# Patient Record
Sex: MALE | Race: OTHER | Employment: UNEMPLOYED | URBAN - METROPOLITAN AREA
[De-identification: names, ages, dates, MRNs, and addresses within clinical notes are randomized per-mention and may not be internally consistent; named-entity substitution may affect disease eponyms.]

---

## 2024-03-24 ENCOUNTER — APPOINTMENT (OUTPATIENT)
Dept: CT IMAGING | Age: 24
DRG: 399 | End: 2024-03-24
Payer: MEDICAID

## 2024-03-24 ENCOUNTER — HOSPITAL ENCOUNTER (INPATIENT)
Age: 24
LOS: 1 days | Discharge: HOME | DRG: 399 | End: 2024-03-25
Attending: SURGERY | Admitting: SURGERY
Payer: MEDICAID

## 2024-03-24 DIAGNOSIS — K37 APPENDICITIS, UNSPECIFIED APPENDICITIS TYPE: ICD-10-CM

## 2024-03-24 DIAGNOSIS — K35.80 ACUTE APPENDICITIS, UNSPECIFIED ACUTE APPENDICITIS TYPE: Primary | ICD-10-CM

## 2024-03-24 LAB
ALBUMIN SERPL-MCNC: 4.7 G/DL (ref 3.5–5.2)
ALP SERPL-CCNC: 60 U/L (ref 40–129)
ALT SERPL-CCNC: 128 U/L (ref 0–40)
ANION GAP SERPL CALCULATED.3IONS-SCNC: 12 MMOL/L (ref 7–16)
AST SERPL-CCNC: 53 U/L (ref 0–39)
BASOPHILS # BLD: 0.04 K/UL (ref 0–0.2)
BASOPHILS NFR BLD: 0 % (ref 0–2)
BILIRUB SERPL-MCNC: 0.9 MG/DL (ref 0–1.2)
BUN SERPL-MCNC: 9 MG/DL (ref 6–20)
CALCIUM SERPL-MCNC: 8.8 MG/DL (ref 8.6–10.2)
CHLORIDE SERPL-SCNC: 100 MMOL/L (ref 98–107)
CO2 SERPL-SCNC: 28 MMOL/L (ref 22–29)
CREAT SERPL-MCNC: 0.9 MG/DL (ref 0.7–1.2)
EOSINOPHIL # BLD: 0.17 K/UL (ref 0.05–0.5)
EOSINOPHILS RELATIVE PERCENT: 2 % (ref 0–6)
ERYTHROCYTE [DISTWIDTH] IN BLOOD BY AUTOMATED COUNT: 12.4 % (ref 11.5–15)
GFR SERPL CREATININE-BSD FRML MDRD: >60 ML/MIN/1.73M2
GLUCOSE SERPL-MCNC: 118 MG/DL (ref 74–99)
HCT VFR BLD AUTO: 46.9 % (ref 37–54)
HGB BLD-MCNC: 15.3 G/DL (ref 12.5–16.5)
IMM GRANULOCYTES # BLD AUTO: 0.04 K/UL (ref 0–0.58)
IMM GRANULOCYTES NFR BLD: 0 % (ref 0–5)
LACTATE BLDV-SCNC: 1.7 MMOL/L (ref 0.5–2.2)
LIPASE SERPL-CCNC: 22 U/L (ref 13–60)
LYMPHOCYTES NFR BLD: 2.7 K/UL (ref 1.5–4)
LYMPHOCYTES RELATIVE PERCENT: 26 % (ref 20–42)
MCH RBC QN AUTO: 27.3 PG (ref 26–35)
MCHC RBC AUTO-ENTMCNC: 32.6 G/DL (ref 32–34.5)
MCV RBC AUTO: 83.8 FL (ref 80–99.9)
MONOCYTES NFR BLD: 0.93 K/UL (ref 0.1–0.95)
MONOCYTES NFR BLD: 9 % (ref 2–12)
NEUTROPHILS NFR BLD: 63 % (ref 43–80)
NEUTS SEG NFR BLD: 6.45 K/UL (ref 1.8–7.3)
PLATELET # BLD AUTO: 283 K/UL (ref 130–450)
PMV BLD AUTO: 9.4 FL (ref 7–12)
POTASSIUM SERPL-SCNC: 4.1 MMOL/L (ref 3.5–5)
PROT SERPL-MCNC: 7.6 G/DL (ref 6.4–8.3)
RBC # BLD AUTO: 5.6 M/UL (ref 3.8–5.8)
SODIUM SERPL-SCNC: 140 MMOL/L (ref 132–146)
WBC OTHER # BLD: 10.3 K/UL (ref 4.5–11.5)

## 2024-03-24 PROCEDURE — 83690 ASSAY OF LIPASE: CPT

## 2024-03-24 PROCEDURE — 81001 URINALYSIS AUTO W/SCOPE: CPT

## 2024-03-24 PROCEDURE — 83605 ASSAY OF LACTIC ACID: CPT

## 2024-03-24 PROCEDURE — 85025 COMPLETE CBC W/AUTO DIFF WBC: CPT

## 2024-03-24 PROCEDURE — 80053 COMPREHEN METABOLIC PANEL: CPT

## 2024-03-24 PROCEDURE — 99285 EMERGENCY DEPT VISIT HI MDM: CPT

## 2024-03-24 PROCEDURE — 6360000004 HC RX CONTRAST MEDICATION: Performed by: RADIOLOGY

## 2024-03-24 PROCEDURE — 74177 CT ABD & PELVIS W/CONTRAST: CPT

## 2024-03-24 PROCEDURE — 96375 TX/PRO/DX INJ NEW DRUG ADDON: CPT

## 2024-03-24 PROCEDURE — 96374 THER/PROPH/DIAG INJ IV PUSH: CPT

## 2024-03-24 RX ORDER — ONDANSETRON 2 MG/ML
4 INJECTION INTRAMUSCULAR; INTRAVENOUS ONCE
Status: COMPLETED | OUTPATIENT
Start: 2024-03-24 | End: 2024-03-25

## 2024-03-24 RX ORDER — 0.9 % SODIUM CHLORIDE 0.9 %
1000 INTRAVENOUS SOLUTION INTRAVENOUS ONCE
Status: COMPLETED | OUTPATIENT
Start: 2024-03-24 | End: 2024-03-25

## 2024-03-24 RX ADMIN — IOPAMIDOL 75 ML: 755 INJECTION, SOLUTION INTRAVENOUS at 23:29

## 2024-03-25 ENCOUNTER — ANESTHESIA (OUTPATIENT)
Dept: OPERATING ROOM | Age: 24
DRG: 399 | End: 2024-03-25
Payer: MEDICAID

## 2024-03-25 ENCOUNTER — ANESTHESIA EVENT (OUTPATIENT)
Dept: OPERATING ROOM | Age: 24
DRG: 399 | End: 2024-03-25
Payer: MEDICAID

## 2024-03-25 VITALS
TEMPERATURE: 99.2 F | SYSTOLIC BLOOD PRESSURE: 129 MMHG | RESPIRATION RATE: 16 BRPM | HEART RATE: 101 BPM | DIASTOLIC BLOOD PRESSURE: 67 MMHG | OXYGEN SATURATION: 100 %

## 2024-03-25 PROBLEM — K35.80 ACUTE APPENDICITIS: Status: ACTIVE | Noted: 2024-03-25

## 2024-03-25 LAB
BILIRUB UR QL STRIP: NEGATIVE
CLARITY UR: CLEAR
COLOR UR: YELLOW
GLUCOSE UR STRIP-MCNC: NEGATIVE MG/DL
HGB UR QL STRIP.AUTO: NEGATIVE
KETONES UR STRIP-MCNC: NEGATIVE MG/DL
LEUKOCYTE ESTERASE UR QL STRIP: NEGATIVE
NITRITE UR QL STRIP: NEGATIVE
PH UR STRIP: 7 [PH] (ref 5–9)
PROT UR STRIP-MCNC: NEGATIVE MG/DL
RBC #/AREA URNS HPF: NORMAL /HPF
SP GR UR STRIP: 1.02 (ref 1–1.03)
UROBILINOGEN UR STRIP-ACNC: 0.2 EU/DL (ref 0–1)
WBC #/AREA URNS HPF: NORMAL /HPF

## 2024-03-25 PROCEDURE — 3600000014 HC SURGERY LEVEL 4 ADDTL 15MIN: Performed by: SURGERY

## 2024-03-25 PROCEDURE — 2500000003 HC RX 250 WO HCPCS: Performed by: NURSE ANESTHETIST, CERTIFIED REGISTERED

## 2024-03-25 PROCEDURE — 7100000000 HC PACU RECOVERY - FIRST 15 MIN: Performed by: SURGERY

## 2024-03-25 PROCEDURE — 6360000002 HC RX W HCPCS: Performed by: NURSE ANESTHETIST, CERTIFIED REGISTERED

## 2024-03-25 PROCEDURE — 2580000003 HC RX 258

## 2024-03-25 PROCEDURE — 0DTJ4ZZ RESECTION OF APPENDIX, PERCUTANEOUS ENDOSCOPIC APPROACH: ICD-10-PCS | Performed by: SURGERY

## 2024-03-25 PROCEDURE — 3600000004 HC SURGERY LEVEL 4 BASE: Performed by: SURGERY

## 2024-03-25 PROCEDURE — 2580000003 HC RX 258: Performed by: SURGERY

## 2024-03-25 PROCEDURE — 87081 CULTURE SCREEN ONLY: CPT

## 2024-03-25 PROCEDURE — 2720000010 HC SURG SUPPLY STERILE: Performed by: SURGERY

## 2024-03-25 PROCEDURE — 2500000003 HC RX 250 WO HCPCS: Performed by: SURGERY

## 2024-03-25 PROCEDURE — 2580000003 HC RX 258: Performed by: NURSE ANESTHETIST, CERTIFIED REGISTERED

## 2024-03-25 PROCEDURE — 3700000001 HC ADD 15 MINUTES (ANESTHESIA): Performed by: SURGERY

## 2024-03-25 PROCEDURE — 6360000002 HC RX W HCPCS

## 2024-03-25 PROCEDURE — 2709999900 HC NON-CHARGEABLE SUPPLY: Performed by: SURGERY

## 2024-03-25 PROCEDURE — 88304 TISSUE EXAM BY PATHOLOGIST: CPT

## 2024-03-25 PROCEDURE — 1200000000 HC SEMI PRIVATE

## 2024-03-25 PROCEDURE — 7100000001 HC PACU RECOVERY - ADDTL 15 MIN: Performed by: SURGERY

## 2024-03-25 PROCEDURE — 3700000000 HC ANESTHESIA ATTENDED CARE: Performed by: SURGERY

## 2024-03-25 RX ORDER — FENTANYL CITRATE 50 UG/ML
INJECTION, SOLUTION INTRAMUSCULAR; INTRAVENOUS PRN
Status: DISCONTINUED | OUTPATIENT
Start: 2024-03-25 | End: 2024-03-25 | Stop reason: SDUPTHER

## 2024-03-25 RX ORDER — SODIUM CHLORIDE, SODIUM LACTATE, POTASSIUM CHLORIDE, CALCIUM CHLORIDE 600; 310; 30; 20 MG/100ML; MG/100ML; MG/100ML; MG/100ML
INJECTION, SOLUTION INTRAVENOUS CONTINUOUS
Status: DISCONTINUED | OUTPATIENT
Start: 2024-03-25 | End: 2024-03-25 | Stop reason: HOSPADM

## 2024-03-25 RX ORDER — SODIUM CHLORIDE 0.9 % (FLUSH) 0.9 %
5-40 SYRINGE (ML) INJECTION PRN
Status: DISCONTINUED | OUTPATIENT
Start: 2024-03-25 | End: 2024-03-25 | Stop reason: HOSPADM

## 2024-03-25 RX ORDER — MIDAZOLAM HYDROCHLORIDE 1 MG/ML
INJECTION INTRAMUSCULAR; INTRAVENOUS PRN
Status: DISCONTINUED | OUTPATIENT
Start: 2024-03-25 | End: 2024-03-25 | Stop reason: SDUPTHER

## 2024-03-25 RX ORDER — HYDRALAZINE HYDROCHLORIDE 20 MG/ML
10 INJECTION INTRAMUSCULAR; INTRAVENOUS
Status: DISCONTINUED | OUTPATIENT
Start: 2024-03-25 | End: 2024-03-25 | Stop reason: HOSPADM

## 2024-03-25 RX ORDER — SODIUM CHLORIDE 9 MG/ML
INJECTION, SOLUTION INTRAVENOUS PRN
Status: DISCONTINUED | OUTPATIENT
Start: 2024-03-25 | End: 2024-03-25 | Stop reason: HOSPADM

## 2024-03-25 RX ORDER — ONDANSETRON 2 MG/ML
INJECTION INTRAMUSCULAR; INTRAVENOUS PRN
Status: DISCONTINUED | OUTPATIENT
Start: 2024-03-25 | End: 2024-03-25 | Stop reason: SDUPTHER

## 2024-03-25 RX ORDER — ROCURONIUM BROMIDE 10 MG/ML
INJECTION, SOLUTION INTRAVENOUS PRN
Status: DISCONTINUED | OUTPATIENT
Start: 2024-03-25 | End: 2024-03-25 | Stop reason: SDUPTHER

## 2024-03-25 RX ORDER — ONDANSETRON 2 MG/ML
4 INJECTION INTRAMUSCULAR; INTRAVENOUS
Status: DISCONTINUED | OUTPATIENT
Start: 2024-03-25 | End: 2024-03-25 | Stop reason: HOSPADM

## 2024-03-25 RX ORDER — MIDAZOLAM HYDROCHLORIDE 2 MG/2ML
2 INJECTION, SOLUTION INTRAMUSCULAR; INTRAVENOUS
Status: DISCONTINUED | OUTPATIENT
Start: 2024-03-25 | End: 2024-03-25 | Stop reason: HOSPADM

## 2024-03-25 RX ORDER — OXYCODONE HYDROCHLORIDE AND ACETAMINOPHEN 5; 325 MG/1; MG/1
1 TABLET ORAL EVERY 6 HOURS PRN
Qty: 12 TABLET | Refills: 0 | Status: SHIPPED | OUTPATIENT
Start: 2024-03-25 | End: 2024-03-28

## 2024-03-25 RX ORDER — DEXAMETHASONE SODIUM PHOSPHATE 4 MG/ML
INJECTION, SOLUTION INTRA-ARTICULAR; INTRALESIONAL; INTRAMUSCULAR; INTRAVENOUS; SOFT TISSUE PRN
Status: DISCONTINUED | OUTPATIENT
Start: 2024-03-25 | End: 2024-03-25 | Stop reason: SDUPTHER

## 2024-03-25 RX ORDER — SODIUM CHLORIDE 0.9 % (FLUSH) 0.9 %
5-40 SYRINGE (ML) INJECTION EVERY 12 HOURS SCHEDULED
Status: DISCONTINUED | OUTPATIENT
Start: 2024-03-25 | End: 2024-03-25 | Stop reason: HOSPADM

## 2024-03-25 RX ORDER — PROPOFOL 10 MG/ML
INJECTION, EMULSION INTRAVENOUS PRN
Status: DISCONTINUED | OUTPATIENT
Start: 2024-03-25 | End: 2024-03-25 | Stop reason: SDUPTHER

## 2024-03-25 RX ORDER — NALOXONE HYDROCHLORIDE 0.4 MG/ML
INJECTION, SOLUTION INTRAMUSCULAR; INTRAVENOUS; SUBCUTANEOUS PRN
Status: DISCONTINUED | OUTPATIENT
Start: 2024-03-25 | End: 2024-03-25 | Stop reason: HOSPADM

## 2024-03-25 RX ORDER — IPRATROPIUM BROMIDE AND ALBUTEROL SULFATE 2.5; .5 MG/3ML; MG/3ML
1 SOLUTION RESPIRATORY (INHALATION)
Status: DISCONTINUED | OUTPATIENT
Start: 2024-03-25 | End: 2024-03-25 | Stop reason: HOSPADM

## 2024-03-25 RX ORDER — MEPERIDINE HYDROCHLORIDE 25 MG/ML
12.5 INJECTION INTRAMUSCULAR; INTRAVENOUS; SUBCUTANEOUS EVERY 5 MIN PRN
Status: DISCONTINUED | OUTPATIENT
Start: 2024-03-25 | End: 2024-03-25 | Stop reason: HOSPADM

## 2024-03-25 RX ORDER — LIDOCAINE HYDROCHLORIDE AND EPINEPHRINE 10; 10 MG/ML; UG/ML
INJECTION, SOLUTION INFILTRATION; PERINEURAL PRN
Status: DISCONTINUED | OUTPATIENT
Start: 2024-03-25 | End: 2024-03-25 | Stop reason: ALTCHOICE

## 2024-03-25 RX ORDER — LABETALOL HYDROCHLORIDE 5 MG/ML
10 INJECTION, SOLUTION INTRAVENOUS
Status: DISCONTINUED | OUTPATIENT
Start: 2024-03-25 | End: 2024-03-25 | Stop reason: HOSPADM

## 2024-03-25 RX ORDER — SODIUM CHLORIDE, SODIUM LACTATE, POTASSIUM CHLORIDE, CALCIUM CHLORIDE 600; 310; 30; 20 MG/100ML; MG/100ML; MG/100ML; MG/100ML
INJECTION, SOLUTION INTRAVENOUS CONTINUOUS PRN
Status: DISCONTINUED | OUTPATIENT
Start: 2024-03-25 | End: 2024-03-25 | Stop reason: SDUPTHER

## 2024-03-25 RX ORDER — ONDANSETRON 2 MG/ML
4 INJECTION INTRAMUSCULAR; INTRAVENOUS EVERY 6 HOURS PRN
Status: DISCONTINUED | OUTPATIENT
Start: 2024-03-25 | End: 2024-03-25 | Stop reason: HOSPADM

## 2024-03-25 RX ORDER — METRONIDAZOLE 500 MG/100ML
500 INJECTION, SOLUTION INTRAVENOUS EVERY 8 HOURS
Status: DISCONTINUED | OUTPATIENT
Start: 2024-03-25 | End: 2024-03-25 | Stop reason: HOSPADM

## 2024-03-25 RX ADMIN — METRONIDAZOLE 500 MG: 500 INJECTION, SOLUTION INTRAVENOUS at 08:53

## 2024-03-25 RX ADMIN — FENTANYL CITRATE 100 MCG: 50 INJECTION, SOLUTION INTRAMUSCULAR; INTRAVENOUS at 06:31

## 2024-03-25 RX ADMIN — ONDANSETRON 4 MG: 2 INJECTION INTRAMUSCULAR; INTRAVENOUS at 00:08

## 2024-03-25 RX ADMIN — SUGAMMADEX 250 MG: 100 INJECTION, SOLUTION INTRAVENOUS at 06:47

## 2024-03-25 RX ADMIN — MIDAZOLAM 2 MG: 1 INJECTION INTRAMUSCULAR; INTRAVENOUS at 06:12

## 2024-03-25 RX ADMIN — PROPOFOL 400 MG: 10 INJECTION, EMULSION INTRAVENOUS at 06:19

## 2024-03-25 RX ADMIN — METRONIDAZOLE 500 MG: 500 INJECTION, SOLUTION INTRAVENOUS at 01:51

## 2024-03-25 RX ADMIN — SODIUM CHLORIDE, POTASSIUM CHLORIDE, SODIUM LACTATE AND CALCIUM CHLORIDE: 600; 310; 30; 20 INJECTION, SOLUTION INTRAVENOUS at 02:59

## 2024-03-25 RX ADMIN — CEFTRIAXONE SODIUM 2000 MG: 2 INJECTION, POWDER, FOR SOLUTION INTRAMUSCULAR; INTRAVENOUS at 01:32

## 2024-03-25 RX ADMIN — SODIUM CHLORIDE, POTASSIUM CHLORIDE, SODIUM LACTATE AND CALCIUM CHLORIDE: 600; 310; 30; 20 INJECTION, SOLUTION INTRAVENOUS at 08:53

## 2024-03-25 RX ADMIN — SODIUM CHLORIDE 1000 ML: 9 INJECTION, SOLUTION INTRAVENOUS at 00:07

## 2024-03-25 RX ADMIN — ONDANSETRON 4 MG: 2 INJECTION INTRAMUSCULAR; INTRAVENOUS at 06:29

## 2024-03-25 RX ADMIN — DEXAMETHASONE SODIUM PHOSPHATE 10 MG: 4 INJECTION, SOLUTION INTRAMUSCULAR; INTRAVENOUS at 06:21

## 2024-03-25 RX ADMIN — FENTANYL CITRATE 100 MCG: 50 INJECTION, SOLUTION INTRAMUSCULAR; INTRAVENOUS at 06:12

## 2024-03-25 RX ADMIN — ROCURONIUM BROMIDE 50 MG: 10 INJECTION, SOLUTION INTRAVENOUS at 06:19

## 2024-03-25 RX ADMIN — SODIUM CHLORIDE, POTASSIUM CHLORIDE, SODIUM LACTATE AND CALCIUM CHLORIDE: 600; 310; 30; 20 INJECTION, SOLUTION INTRAVENOUS at 06:09

## 2024-03-25 ASSESSMENT — PAIN SCALES - GENERAL
PAINLEVEL_OUTOF10: 2
PAINLEVEL_OUTOF10: 0
PAINLEVEL_OUTOF10: 4

## 2024-03-25 ASSESSMENT — PAIN DESCRIPTION - ORIENTATION
ORIENTATION: LOWER
ORIENTATION: LOWER

## 2024-03-25 ASSESSMENT — PAIN DESCRIPTION - DESCRIPTORS: DESCRIPTORS: SORE

## 2024-03-25 ASSESSMENT — LIFESTYLE VARIABLES: SMOKING_STATUS: 1

## 2024-03-25 ASSESSMENT — PAIN DESCRIPTION - ONSET: ONSET: ON-GOING

## 2024-03-25 ASSESSMENT — PAIN DESCRIPTION - FREQUENCY: FREQUENCY: CONTINUOUS

## 2024-03-25 ASSESSMENT — PAIN DESCRIPTION - LOCATION
LOCATION: ABDOMEN
LOCATION: ABDOMEN

## 2024-03-25 ASSESSMENT — PAIN - FUNCTIONAL ASSESSMENT: PAIN_FUNCTIONAL_ASSESSMENT: ACTIVITIES ARE NOT PREVENTED

## 2024-03-25 ASSESSMENT — PAIN DESCRIPTION - PAIN TYPE: TYPE: SURGICAL PAIN

## 2024-03-25 NOTE — H&P
Protein 6.4 - 8.3 g/dL 7.6  3/24/24 21:12   Albumin 3.5 - 5.2 g/dL 4.7  3/24/24 21:12   Alk Phos 40 - 129 U/L 60  3/24/24 21:12   ALT 0 - 40 U/L 128 (H)  3/24/24 21:12   AST 0 - 39 U/L 53 (H)  3/24/24 21:12   BILIRUBIN TOTAL 0.0 - 1.2 mg/dL 0.9  3/24/24 21:12   Lipase 13 - 60 U/L 22  3/24/24 21:12   WBC 4.5 - 11.5 k/uL 10.3  3/24/24 21:12   RBC 3.80 - 5.80 m/uL 5.60  3/24/24 21:12   Hemoglobin Quant 12.5 - 16.5 g/dL 15.3  3/24/24 21:12   Hematocrit 37.0 - 54.0 % 46.9  3/24/24 21:12   MCV 80.0 - 99.9 fL 83.8  3/24/24 21:12   MCH 26.0 - 35.0 pg 27.3  3/24/24 21:12   MCHC 32.0 - 34.5 g/dL 32.6  3/24/24 21:12   MPV 7.0 - 12.0 fL 9.4  3/24/24 21:12   RDW 11.5 - 15.0 % 12.4  3/24/24 21:12   Platelet Count 130 - 450 k/uL 283  3/24/24 21:12   Neutrophils, Automated 43.0 - 80.0 % 63  3/24/24 21:12   Lymphocyte % 20.0 - 42.0 % 26  3/24/24 21:12   Monocytes % 2.0 - 12.0 % 9  3/24/24 21:12   Eosinophils % 0 - 6 % 2  3/24/24 21:12   Basophils % 0.0 - 2.0 % 0  3/24/24 21:12   Neutrophils Absolute 1.80 - 7.30 k/uL 6.45  3/24/24 21:12   Lymphocytes Absolute 1.50 - 4.00 k/uL 2.70  3/24/24 21:12   Monocytes Absolute 0.10 - 0.95 k/uL 0.93  3/24/24 21:12   Eosinophils Absolute 0.05 - 0.50 k/uL 0.17  3/24/24 21:12   Basophils Absolute 0.00 - 0.20 k/uL 0.04  3/24/24 21:12   Immature Granulocytes 0.0 - 5.0 % 0  3/24/24 21:12   Absolute Immature Granulocyte 0.00 - 0.58 k/uL 0.04  3/24/24 21:12   (H): Data is abnormally high    Radiology:  CT abdomen/pelvis:   IMPRESSION:  1. Findings concerning for acute appendicitis. Trace right pelvic fluid  adjacent to the 15 mm distended appendix.    Assessment:  24 y.o. male with acute appendicitis    Plan:  Recommend laparoscopic possible open appendectomy. Risks, benefits, alteratives (and risks of alternatives) of the procedure were discussed with patient at bedside with use of , all questions answered. He understands and agrees to proceed.      RYAN DICK MD  3/25/2024  5:49 AM

## 2024-03-25 NOTE — PROGRESS NOTES
4 Eyes Skin Assessment     NAME:  Cody Guy  YOB: 2000  MEDICAL RECORD NUMBER:  [id number]    The patient is being assessed for  Admission    I agree that at least one RN has performed a thorough Head to Toe Skin Assessment on the patient. ALL assessment sites listed below have been assessed.      Areas assessed by both nurses:    Head, Face, Ears, Shoulders, Back, Chest, Arms, Elbows, Hands, Sacrum. Buttock, Coccyx, Ischium, and Legs. Feet and Heels        Does the Patient have a Wound? No noted wound(s)       Luciano Prevention initiated by RN: No  Wound Care Orders initiated by RN: No    Pressure Injury (Stage 3,4, Unstageable, DTI, NWPT, and Complex wounds) if present, place Wound referral order by RN under : No    New Ostomies, if present place, Ostomy referral order under : No     Nurse 1 eSignature: Electronically signed by Barbara Cornejo RN on 3/25/24 at 5:13 AM EDT    **SHARE this note so that the co-signing nurse can place an eSignature**    Nurse 2 eSignature: {Esignature:[id number]}

## 2024-03-25 NOTE — PATIENT CARE CONFERENCE
P Quality Flow/Interdisciplinary Rounds Progress Note        Quality Flow Rounds held on March 25, 2024    Disciplines Attending:  Bedside Nurse, , , and Nursing Unit Leadership    Cody Guy was admitted on 3/24/2024 11:58 PM    Anticipated Discharge Date:       Disposition:    Luciano Score:  Luciano Scale Score: 22    Readmission Risk              Risk of Unplanned Readmission:  5           Discussed patient goal for the day, patient clinical progression, and barriers to discharge.  The following Goal(s) of the Day/Commitment(s) have been identified:  Discharge - Obtain Order      Danica Chappell RN  March 25, 2024

## 2024-03-25 NOTE — ANESTHESIA PRE PROCEDURE
Department of Anesthesiology  Preprocedure Note       Name:  Cody Guy   Age:  24 y.o.  :  2000                                          MRN:  [id number]         Date:  3/25/2024      Surgeon: Surgeon(s):  Singh Licona MD    Procedure: Procedure(s):  LAPAROSCOPIC APPENDECTOMY    Medications prior to admission:   Prior to Admission medications    Not on File       Current medications:    Current Facility-Administered Medications   Medication Dose Route Frequency Provider Last Rate Last Admin    metroNIDAZOLE (FLAGYL) 500 mg in 0.9% NaCl 100 mL IVPB premix  500 mg IntraVENous Q8H Barbara Duran APRN - CNP   Stopped at 24 0256    lactated ringers IV soln infusion   IntraVENous Continuous Singh Licona  mL/hr at 24 0259 New Bag at 24 0259    HYDROmorphone (DILAUDID) injection 1 mg  1 mg IntraVENous Q3H PRN Singh Licona MD        ondansetron (ZOFRAN) injection 4 mg  4 mg IntraVENous Q6H PRN Singh Licona MD           Allergies:  No Known Allergies    Problem List:    Patient Active Problem List   Diagnosis Code    Acute appendicitis K35.80       Past Medical History:  History reviewed. No pertinent past medical history.    Past Surgical History:  History reviewed. No pertinent surgical history.    Social History:    Social History     Tobacco Use    Smoking status: Every Day     Current packs/day: 0.50     Types: Cigarettes    Smokeless tobacco: Never   Substance Use Topics    Alcohol use: Yes     Comment: social                                Ready to quit: Not Answered  Counseling given: Not Answered      Vital Signs (Current):   Vitals:    24 2105 24 0153 24 0245   BP: (!) 161/95 137/81 138/66   Pulse: 84 (!) 101 100   Resp: 16 16 16   Temp: 98.2 °F (36.8 °C) 98 °F (36.7 °C) 99 °F (37.2 °C)   TempSrc: Oral Oral Oral   SpO2: 100% 97% 97%                                              BP Readings from Last 3 Encounters:   24 138/66

## 2024-03-25 NOTE — ED PROVIDER NOTES
ED Course User Index  [BB] Henrry Huang DO     Re-Evaluations:  3/25/24      Time: 0130    Patient’s condition is improving.    Consultations:             IP CONSULT TO GENERAL SURGERY  0143-spoke with Dr. Licona who is on-call for general surgery, he is agreeable to admission at this time  Procedures:   none  Medical Decision Making    Patient presents to the ER for diffuse abdominal pain with nausea and vomiting which began today.  Patient states that when he was in prison he had this happen, and they gave him medication for nausea, but he states that he has no medication.  Denies fevers or chills.  Denies chest pain or shortness of breath.   Social Determinants include   Social Connections: Not on file    Social Determinants : None.   Chronic conditions  History reviewed. No pertinent past medical history..    Physical exam patient has tenderness diffusely through the lower abdomen.  Abdomen is soft there is no guarding, rebound tenderness or rigidity.  Negative Davison sign.  Lungs clinical bilaterally.  Regular rate and rhythm.  Mucous membranes are moist and intact.  Patient well-appearing, nontoxic in no acute distress.  Vital signs patient was hypertensive upon arrival blood pressure of 161/95.  Is afebrile, not hypoxic or tachycardic.  Differential diagnoses include but not limited to gastroenteritis, peptic ulcer, acute appendicitis, acute cholecystitis. Diagnostic studies including labs and imagining which was interpreted by radiologist reveals blood work shows no evidence of leukocytosis, anemia or electrolyte imbalance.  ALT was elevated at 128 and AST is slightly elevated at 53.  Lipase is normal.  Lactic acid is normal.  Urinalysis shows no evidence of urinary tract infection.  CT scans of the abdomen and pelvis shows concerning for acute appendicitis trace right pelvic fluid and 15 mm distended appendix. Results were discussed with the patient and he verbalized understanding.  Patient was given

## 2024-03-25 NOTE — ED NOTES
ED to Inpatient Handoff Report    Notified Barbara that electronic handoff available and patient ready for transport to room 509.    Safety Risks: None identified    Patient in Restraints: no    Constant Observer or Patient : no    Telemetry Monitoring Ordered :No           Order to transfer to unit without monitor:N/A    Last MEWS: 2 Time completed: 0153    Deterioration Index Score:   Predictive Model Details          15 (Normal)  Factor Value    Calculated 3/25/2024 01:54 29% Pulse 101    Deterioration Index Model 27% Age 24 years old     16% Systolic 137     16% WBC count 10.3 k/uL     7% Potassium 4.1 mmol/L     2% Sodium 140 mmol/L     1% Pulse oximetry 97 %     1% Hematocrit 46.9 %     1% Temperature 98 °F (36.7 °C)     0% Respiratory rate 16        Vitals:    03/24/24 2105 03/25/24 0153   BP: (!) 161/95 137/81   Pulse: 84 (!) 101   Resp: 16 16   Temp: 98.2 °F (36.8 °C) 98 °F (36.7 °C)   TempSrc: Oral Oral   SpO2: 100% 97%         Opportunity for questions and clarification was provided.

## 2024-03-25 NOTE — PROGRESS NOTES
CLINICAL PHARMACY NOTE: MEDS TO BEDS    Total # of Prescriptions Filled: 1   The following medications were delivered to the patient:  Prednisone 10 mg       Additional Documentation:

## 2024-03-25 NOTE — PROGRESS NOTES
Walked patient's girlfriend to waiting room. She is waiting there for patient. She is also Serbian speaking.    Electronically signed by Barbara Cornejo RN on 3/25/2024 at 6:18 AM

## 2024-03-25 NOTE — OP NOTE
Operative Note      Patient: Cody Guy  YOB: 2000  MRN: [id number]    Date of Procedure: 3/25/2024    Pre-Op Diagnosis Codes:     * Appendicitis, unspecified appendicitis type [K37]    Post-Op Diagnosis: Same       Procedure(s):  LAPAROSCOPIC APPENDECTOMY    Surgeon(s):  Singh Licona MD    Assistant:   Resident: Es Mora MD    Anesthesia: General    Estimated Blood Loss (mL): 10 cc    Complications: None    Specimens:   ID Type Source Tests Collected by Time Destination   A :  Tissue Appendix SURGICAL PATHOLOGY Singh Licona MD 3/25/2024 0638        Implants:  * No implants in log *      Drains:   Urinary Catheter 03/25/24 2 Way (Active)       Findings: Acute appendicitis.  There is evidence of infection present at the time of surgery.        Detailed Description of Procedure:     Indications:    24 year old male with history, physical, laboratory and imaging findings consistent with acute appendicitis. Appendectomy was recommended. Risks, benefits, alternatives (and risks of alternatives) of surgery were discussed with the patient, which include but are not limited to, bleeding, infection, conversion to open, bowel injury, ureter injury, further procedures, hernia formation, risk of anesthesia, blood clots, pneumonia, heart attack and stroke. Patient understands these risk and agrees to proceed.      Procedure:  The patient was taken the operating room and placed in the supine position.  Sequential compression vices were applied.  Patient was already receiving preoperative antibiotics.  Patient's abdomen was prepped and draped in the usual sterile fashion.  Anesthesia was administered per anesthesia record.  Immediately prior to the procedure a timeout was called.  A stab incision was made at Frederick's point and a Veress needle was inserted.  The abdomen was insufflated to 15 mmHg.  A 5 mm port was inserted supraumbilically.  A 10 mm port was inserted in the left lower quadrant

## 2024-03-25 NOTE — DISCHARGE SUMMARY
Physician Discharge Summary     Patient ID:  Cody Guy  [id number]  24 y.o.  2000    Admit date: 3/24/2024    Discharge date and time: 7:17 AM 03/25/24    Admitting Physician: Singh MALONEY MD     Admission Diagnoses: Acute appendicitis [K35.80]    Discharge Diagnoses: Principal Problem:    Acute appendicitis  Resolved Problems:    * No resolved hospital problems. *      Admission Condition: poor    Discharged Condition: stable      Hospital Course:  Cody Guy is a 24 y.o. male who presented with right lower quadrant abdominal pain. Work up revealed acute appendicitis.  He underwent laparoscopic appendectomy on 3/25/2024. The patient's course was otherwise uneventful. He progressed well, pain was controlled on PO medications. He was tolerating a regular diet with no nausea or vomiting, and was in a suitable condition for discharge to home in stable condition.      Consults:   IP CONSULT TO GENERAL SURGERY    Significant Diagnostic Studies:   CT ABDOMEN PELVIS W IV CONTRAST Additional Contrast? None    Result Date: 3/25/2024  EXAMINATION: CT OF THE ABDOMEN AND PELVIS WITH CONTRAST 3/24/2024 11:24 pm TECHNIQUE: CT of the abdomen and pelvis was performed with the administration of intravenous contrast. Multiplanar reformatted images are provided for review. Automated exposure control, iterative reconstruction, and/or weight based adjustment of the mA/kV was utilized to reduce the radiation dose to as low as reasonably achievable. COMPARISON: None. HISTORY: ORDERING SYSTEM PROVIDED HISTORY: diffuse abdominal tenderness TECHNOLOGIST PROVIDED HISTORY: Reason for exam:->diffuse abdominal tenderness Additional Contrast?->None Decision Support Exception - unselect if not a suspected or confirmed emergency medical condition->Emergency Medical Condition (MA) FINDINGS: Lower Chest: No infiltrate or effusion. Organs: Liver, gallbladder, biliary tree, bilateral adrenal glands, bilateral kidneys, spleen and pancreas are

## 2024-03-25 NOTE — PLAN OF CARE
Problem: Pain  Goal: Verbalizes/displays adequate comfort level or baseline comfort level  Outcome: Progressing     Problem: Discharge Planning  Goal: Discharge to home or other facility with appropriate resources  Outcome: Progressing     Problem: Skin/Tissue Integrity  Goal: Absence of new skin breakdown  Description: 1.  Monitor for areas of redness and/or skin breakdown  2.  Assess vascular access sites hourly  3.  Every 4-6 hours minimum:  Change oxygen saturation probe site  4.  Every 4-6 hours:  If on nasal continuous positive airway pressure, respiratory therapy assess nares and determine need for appliance change or resting period.  Outcome: Progressing     Problem: Infection - Adult  Goal: Absence of infection at discharge  Outcome: Progressing

## 2024-03-26 LAB
MICROORGANISM SPEC CULT: NORMAL
SPECIMEN DESCRIPTION: NORMAL

## 2024-03-27 LAB — SURGICAL PATHOLOGY REPORT: NORMAL

## 2024-03-27 NOTE — PROGRESS NOTES
CLINICAL PHARMACY NOTE: MEDS TO BEDS    Total # of Prescriptions Filled: 1   The following medications were delivered to the patient:  Percocet 5/ 325 mg    Additional Documentation:

## 2024-03-27 NOTE — ANESTHESIA POSTPROCEDURE EVALUATION
Department of Anesthesiology  Postprocedure Note    Patient: Cody Guy  MRN: [id number]  YOB: 2000  Date of evaluation: 3/27/2024    Procedure Summary       Date: 03/25/24 Room / Location: [address]    Anesthesia Start: 0609 Anesthesia Stop: 0710    Procedure: LAPAROSCOPIC APPENDECTOMY (Abdomen) Diagnosis:       Appendicitis, unspecified appendicitis type      (Appendicitis, unspecified appendicitis type [K37])    Surgeons: Singh Licona MD Responsible Provider: Justin Smith MD    Anesthesia Type: General ASA Status: 2            Anesthesia Type: General    Patti Phase I: Patti Score: 10    Patti Phase II:      Anesthesia Post Evaluation    Patient location during evaluation: PACU  Patient participation: complete - patient participated  Level of consciousness: awake  Airway patency: patent  Nausea & Vomiting: no nausea and no vomiting  Cardiovascular status: hemodynamically stable  Respiratory status: acceptable  Hydration status: stable  Pain management: adequate    No notable events documented.

## (undated) DEVICE — LIQUIBAND RAPID ADHESIVE 36/CS 0.8ML: Brand: MEDLINE

## (undated) DEVICE — COVER HNDL LT DISP

## (undated) DEVICE — BLADE CLIPPER GEN PURP NS

## (undated) DEVICE — 1LYRTR 16FR10ML 100%SILI SNAP: Brand: MEDLINE INDUSTRIES, INC.

## (undated) DEVICE — SOLUTION IRRIG 1000ML 0.9% SOD CHL USP POUR PLAS BTL

## (undated) DEVICE — DRAPE,CHEST,FENES,15X10,STERIL: Brand: MEDLINE

## (undated) DEVICE — KIT,ANTI FOG,W/SPONGE & FLUID,SOFT PACK: Brand: MEDLINE

## (undated) DEVICE — GLOVE SURG SZ 8 CRM LTX FREE POLYISOPRENE POLYMER BEAD ANTI

## (undated) DEVICE — PUMP SUC IRR TBNG L10FT W/ HNDPC ASSEMB STRYKEFLOW 2

## (undated) DEVICE — TROCAR: Brand: KII® SLEEVE

## (undated) DEVICE — APPLICATOR MEDICATED 26 CC SOLUTION HI LT ORNG CHLORAPREP

## (undated) DEVICE — GLOVE SURG SZ 75 CRM LTX FREE POLYISOPRENE POLYMER BEAD ANTI

## (undated) DEVICE — SEALER ENDOSCP L37CM NANO COAT BLNT TIP LAP DIV

## (undated) DEVICE — PACK PROCEDURE SURG GEN CUST

## (undated) DEVICE — APPLIER CLP M/L SHFT DIA5MM 15 LIG LIGAMAX 5

## (undated) DEVICE — 4-PORT MANIFOLD: Brand: NEPTUNE 2

## (undated) DEVICE — TOWEL,OR,DSP,ST,BLUE,STD,6/PK,12PK/CS: Brand: MEDLINE

## (undated) DEVICE — SYRINGE 20ML LL S/C 50

## (undated) DEVICE — NEEDLE CLOSURE OMNICLOSE

## (undated) DEVICE — NEEDLE HYPO 25GA L1.5IN BLU POLYPR HUB S STL REG BVL STR

## (undated) DEVICE — INSUFFLATION TUBING SET WITH FILTER, FUNNEL CONNECTOR AND LUER LOCK: Brand: JOSNOE MEDICAL INC

## (undated) DEVICE — INSUFFLATION NEEDLE TO ESTABLISH PNEUMOPERITONEUM.: Brand: INSUFFLATION NEEDLE

## (undated) DEVICE — DOUBLE BASIN SET: Brand: MEDLINE INDUSTRIES, INC.

## (undated) DEVICE — GOWN,SIRUS,FABRNF,XL,20/CS: Brand: MEDLINE

## (undated) DEVICE — STAPLER INT L340MM 45MM STD 12 FIRING B FRM PWR + GRIPPING

## (undated) DEVICE — BLADE,STAINLESS-STEEL,11,STRL,DISPOSABLE: Brand: MEDLINE

## (undated) DEVICE — RELOAD STPL L45MM H1.5-3.6MM REG TISS BLU GRIPPING SURF B

## (undated) DEVICE — ELECTRODE PT RET AD L9FT HI MOIST COND ADH HYDRGEL CORDED

## (undated) DEVICE — TROCAR: Brand: KII FIOS FIRST ENTRY

## (undated) DEVICE — TISSUE RETRIEVAL SYSTEM: Brand: INZII RETRIEVAL SYSTEM